# Patient Record
Sex: MALE
[De-identification: names, ages, dates, MRNs, and addresses within clinical notes are randomized per-mention and may not be internally consistent; named-entity substitution may affect disease eponyms.]

---

## 2022-05-18 ENCOUNTER — HOSPITAL ENCOUNTER (EMERGENCY)
Dept: HOSPITAL 5 - ED | Age: 27
LOS: 1 days | Discharge: HOME | End: 2022-05-19
Payer: SELF-PAY

## 2022-05-18 DIAGNOSIS — R45.851: Primary | ICD-10-CM

## 2022-05-18 LAB
ALBUMIN SERPL-MCNC: 4.1 G/DL (ref 3.9–5)
ALT SERPL-CCNC: 10 UNITS/L (ref 7–56)
BASOPHILS # (AUTO): 0 K/MM3 (ref 0–0.1)
BASOPHILS NFR BLD AUTO: 0.3 % (ref 0–1.8)
BUN SERPL-MCNC: 11 MG/DL (ref 9–20)
BUN/CREAT SERPL: 11 %
CALCIUM SERPL-MCNC: 9.2 MG/DL (ref 8.4–10.2)
EOSINOPHIL # BLD AUTO: 0 K/MM3 (ref 0–0.4)
EOSINOPHIL NFR BLD AUTO: 0.6 % (ref 0–4.3)
HCT VFR BLD CALC: 43.5 % (ref 35.5–45.6)
HEMOLYSIS INDEX: 8
HGB BLD-MCNC: 14.3 GM/DL (ref 11.8–15.2)
LYMPHOCYTES # BLD AUTO: 1.7 K/MM3 (ref 1.2–5.4)
LYMPHOCYTES NFR BLD AUTO: 35.1 % (ref 13.4–35)
MCHC RBC AUTO-ENTMCNC: 33 % (ref 32–34)
MCV RBC AUTO: 90 FL (ref 84–94)
MONOCYTES # (AUTO): 0.3 K/MM3 (ref 0–0.8)
MONOCYTES % (AUTO): 7 % (ref 0–7.3)
PLATELET # BLD: 165 K/MM3 (ref 140–440)
RBC # BLD AUTO: 4.81 M/MM3 (ref 3.65–5.03)

## 2022-05-18 PROCEDURE — 99284 EMERGENCY DEPT VISIT MOD MDM: CPT

## 2022-05-18 PROCEDURE — 80320 DRUG SCREEN QUANTALCOHOLS: CPT

## 2022-05-18 PROCEDURE — 80307 DRUG TEST PRSMV CHEM ANLYZR: CPT

## 2022-05-18 PROCEDURE — 85025 COMPLETE CBC W/AUTO DIFF WBC: CPT

## 2022-05-18 PROCEDURE — 81001 URINALYSIS AUTO W/SCOPE: CPT

## 2022-05-18 PROCEDURE — 36415 COLL VENOUS BLD VENIPUNCTURE: CPT

## 2022-05-18 PROCEDURE — 80053 COMPREHEN METABOLIC PANEL: CPT

## 2022-05-18 PROCEDURE — G0480 DRUG TEST DEF 1-7 CLASSES: HCPCS

## 2022-05-18 NOTE — EMERGENCY DEPARTMENT REPORT
ED General Adult HPI





- General


Chief complaint: Psych


Stated complaint: SI


Time Seen by Provider: 05/18/22 18:36


Source: patient, EMS


Mode of arrival: Stretcher


Limitations: No Limitations





- History of Present Illness


Initial comments: 





patient presents with complaints of suicidal thoughts. Denies concrete plan. Has

a hx of depression. Denies visual and auditory hallucinations. Denoies CP, SOB, 

palpitations, diaphoresis, abd pain, back pain, numbness, weakness. 





- Related Data


                                Home Medications











 Medication  Instructions  Recorded  Confirmed  Last Taken


 


No Known Home Medications [No  05/18/22 05/18/22 Unknown





Reported Home Medications]    











                                    Allergies











Allergy/AdvReac Type Severity Reaction Status Date / Time


 


No Known Allergies Allergy   Verified 05/18/22 17:49














ED Review of Systems


ROS: 


Stated complaint: SI


Other details as noted in HPI





Comment: All other systems reviewed and negative


Constitutional: denies: chills, fever





ED Past Medical Hx





- Past Medical History


Hx Psychiatric Treatment: Yes (Depression)


Hx HIV: Yes





- Medications


Home Medications: 


                                Home Medications











 Medication  Instructions  Recorded  Confirmed  Last Taken  Type


 


No Known Home Medications [No  05/18/22 05/18/22 Unknown History





Reported Home Medications]     














ED Physical Exam





- General


Limitations: No Limitations


General appearance: alert, in no apparent distress





- Head


Head exam: Present: atraumatic, normocephalic





- Eye


Eye exam: Present: PERRL, EOMI





- ENT


ENT exam: Present: mucous membranes moist, other (airway patent)





- Neck


Neck exam: Present: other (supple; no JVD)





- Respiratory


Respiratory exam: Present: other (good air entry, nml I:E, CTAB, no use of BETHANY)





- Cardiovascular


Cardiovascular Exam: Present: regular rate.  Absent: rubs, gallop





- GI/Abdominal


GI/Abdominal exam: Present: soft, normal bowel sounds.  Absent: distended, 

tenderness





- Extremities Exam


Extremities exam: Present: full ROM.  Absent: tenderness





- Back Exam


Back exam: Present: full ROM.  Absent: tenderness





- Neurological Exam


Neurological exam: Present: alert, oriented X3, CN II-XII intact.  Absent: motor

sensory deficit





- Psychiatric


Psychiatric exam: Present: flat affect, suicidal ideation.  Absent: homicidal 

ideation





- Skin


Skin exam: Present: warm, normal color





ED Course


                                   Vital Signs











  05/18/22 05/18/22





  17:46 20:21


 


Temperature  99.2 F


 


Pulse Rate 81 71


 


Respiratory  18





Rate  


 


Blood Pressure 130/95 107/78





[Left]  


 


O2 Sat by Pulse 98 98





Oximetry  














ED Medical Decision Making





- Lab Data


Result diagrams: 


                                 05/18/22 19:05





                                 05/18/22 19:05








                                Laboratory Tests











  05/18/22 05/18/22 05/18/22





  19:05 19:05 19:05


 


WBC  5.0  


 


RBC  4.81  


 


Hgb  14.3  


 


Hct  43.5  


 


MCV  90  


 


MCH  30  


 


MCHC  33  


 


RDW  13.2  


 


Plt Count  165  


 


Lymph % (Auto)  35.1 H  


 


Mono % (Auto)  7.0  


 


Eos % (Auto)  0.6  


 


Baso % (Auto)  0.3  


 


Lymph # (Auto)  1.7  


 


Mono # (Auto)  0.3  


 


Eos # (Auto)  0.0  


 


Baso # (Auto)  0.0  


 


Seg Neutrophils %  57.0  


 


Seg Neutrophils #  2.8  


 


Sodium   138 


 


Potassium   3.9 


 


Chloride   101.0 


 


Carbon Dioxide   26 


 


Anion Gap   15 


 


BUN   11 


 


Creatinine   1.0 


 


Estimated GFR   > 60 


 


BUN/Creatinine Ratio   11 


 


Glucose   86 


 


Calcium   9.2 


 


Total Bilirubin   0.50 


 


AST   15 


 


ALT   10 


 


Alkaline Phosphatase   94 


 


Total Protein   7.9 


 


Albumin   4.1 


 


Albumin/Globulin Ratio   1.1 


 


Salicylates    < 0.3 L


 


Acetaminophen   


 


Plasma/Serum Alcohol   














  05/18/22 05/18/22





  19:05 19:05


 


WBC  


 


RBC  


 


Hgb  


 


Hct  


 


MCV  


 


MCH  


 


MCHC  


 


RDW  


 


Plt Count  


 


Lymph % (Auto)  


 


Mono % (Auto)  


 


Eos % (Auto)  


 


Baso % (Auto)  


 


Lymph # (Auto)  


 


Mono # (Auto)  


 


Eos # (Auto)  


 


Baso # (Auto)  


 


Seg Neutrophils %  


 


Seg Neutrophils #  


 


Sodium  


 


Potassium  


 


Chloride  


 


Carbon Dioxide  


 


Anion Gap  


 


BUN  


 


Creatinine  


 


Estimated GFR  


 


BUN/Creatinine Ratio  


 


Glucose  


 


Calcium  


 


Total Bilirubin  


 


AST  


 


ALT  


 


Alkaline Phosphatase  


 


Total Protein  


 


Albumin  


 


Albumin/Globulin Ratio  


 


Salicylates  


 


Acetaminophen  5.0 L 


 


Plasma/Serum Alcohol   < 0.01

















UA, U txo pending





- Medical Decision Making





1013 signed. MH consulted. 


Critical care attestation.: 


If time is entered above; I have spent that time in minutes in the direct care 

of this critically ill patient, excluding procedure time.








ED Disposition


Clinical Impression: 


 Suicidal thoughts





Disposition: 30 STILL A PATIENT


Is pt being admited?: No


Does the pt Need Aspirin: No


Condition: Stable


Time of Disposition: 21:00 (Patient care transferred to Dr. Short (oncoming night

 ER doc). Sign out was given by me to him. )

## 2022-05-19 VITALS — SYSTOLIC BLOOD PRESSURE: 113 MMHG | DIASTOLIC BLOOD PRESSURE: 88 MMHG

## 2022-05-19 LAB
BILIRUB UR QL STRIP: (no result)
BLOOD UR QL VISUAL: (no result)
MUCOUS THREADS #/AREA URNS HPF: (no result) /HPF
PH UR STRIP: 7 [PH] (ref 5–7)
PROT UR STRIP-MCNC: (no result) MG/DL
RBC #/AREA URNS HPF: < 1 /HPF (ref 0–6)
UROBILINOGEN UR-MCNC: 2 MG/DL (ref ?–2)
WBC #/AREA URNS HPF: 1 /HPF (ref 0–6)
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
## 2022-05-19 NOTE — EVENT NOTE
Date: 05/19/22


Follow up progress note on psych patient. Patient had presented with complaints 

of SI. Patient was seen by psych and cleared for discharge, aoutpatyient follow 

up, w Rx for trazodone and sertraline. No issues overnight. No complaints this 

morning. 














                                Laboratory Tests











  05/18/22 05/18/22 05/18/22





  19:05 19:05 19:05


 


WBC  5.0  


 


RBC  4.81  


 


Hgb  14.3  


 


Hct  43.5  


 


MCV  90  


 


MCH  30  


 


MCHC  33  


 


RDW  13.2  


 


Plt Count  165  


 


Lymph % (Auto)  35.1 H  


 


Mono % (Auto)  7.0  


 


Eos % (Auto)  0.6  


 


Baso % (Auto)  0.3  


 


Lymph # (Auto)  1.7  


 


Mono # (Auto)  0.3  


 


Eos # (Auto)  0.0  


 


Baso # (Auto)  0.0  


 


Seg Neutrophils %  57.0  


 


Seg Neutrophils #  2.8  


 


Sodium   138 


 


Potassium   3.9 


 


Chloride   101.0 


 


Carbon Dioxide   26 


 


Anion Gap   15 


 


BUN   11 


 


Creatinine   1.0 


 


Estimated GFR   > 60 


 


BUN/Creatinine Ratio   11 


 


Glucose   86 


 


Calcium   9.2 


 


Total Bilirubin   0.50 


 


AST   15 


 


ALT   10 


 


Alkaline Phosphatase   94 


 


Total Protein   7.9 


 


Albumin   4.1 


 


Albumin/Globulin Ratio   1.1 


 


Urine Color   


 


Urine Turbidity   


 


Urine pH   


 


Ur Specific Gravity   


 


Urine Protein   


 


Urine Glucose (UA)   


 


Urine Ketones   


 


Urine Blood   


 


Urine Nitrite   


 


Urine Bilirubin   


 


Urine Urobilinogen   


 


Ur Leukocyte Esterase   


 


Urine WBC (Auto)   


 


Urine RBC (Auto)   


 


Urine Mucus   


 


Salicylates    < 0.3 L


 


Urine Opiates Screen   


 


Urine Methadone Screen   


 


Acetaminophen   


 


Ur Barbiturates Screen   


 


Ur Phencyclidine Scrn   


 


Ur Amphetamines Screen   


 


U Benzodiazepines Scrn   


 


Urine Cocaine Screen   


 


U Marijuana (THC) Screen   


 


Drugs of Abuse Note   


 


Plasma/Serum Alcohol   














  05/18/22 05/18/22 05/19/22





  19:05 19:05 Unknown


 


WBC   


 


RBC   


 


Hgb   


 


Hct   


 


MCV   


 


MCH   


 


MCHC   


 


RDW   


 


Plt Count   


 


Lymph % (Auto)   


 


Mono % (Auto)   


 


Eos % (Auto)   


 


Baso % (Auto)   


 


Lymph # (Auto)   


 


Mono # (Auto)   


 


Eos # (Auto)   


 


Baso # (Auto)   


 


Seg Neutrophils %   


 


Seg Neutrophils #   


 


Sodium   


 


Potassium   


 


Chloride   


 


Carbon Dioxide   


 


Anion Gap   


 


BUN   


 


Creatinine   


 


Estimated GFR   


 


BUN/Creatinine Ratio   


 


Glucose   


 


Calcium   


 


Total Bilirubin   


 


AST   


 


ALT   


 


Alkaline Phosphatase   


 


Total Protein   


 


Albumin   


 


Albumin/Globulin Ratio   


 


Urine Color    Yellow


 


Urine Turbidity    Clear


 


Urine pH    7.0


 


Ur Specific Gravity    1.016


 


Urine Protein    <15 mg/dl


 


Urine Glucose (UA)    Neg


 


Urine Ketones    Neg


 


Urine Blood    Neg


 


Urine Nitrite    Neg


 


Urine Bilirubin    Neg


 


Urine Urobilinogen    2.0


 


Ur Leukocyte Esterase    Neg


 


Urine WBC (Auto)    1.0


 


Urine RBC (Auto)    < 1.0


 


Urine Mucus    Few


 


Salicylates   


 


Urine Opiates Screen   


 


Urine Methadone Screen   


 


Acetaminophen  5.0 L  


 


Ur Barbiturates Screen   


 


Ur Phencyclidine Scrn   


 


Ur Amphetamines Screen   


 


U Benzodiazepines Scrn   


 


Urine Cocaine Screen   


 


U Marijuana (THC) Screen   


 


Drugs of Abuse Note   


 


Plasma/Serum Alcohol   < 0.01 














  05/19/22





  Unknown


 


WBC 


 


RBC 


 


Hgb 


 


Hct 


 


MCV 


 


MCH 


 


MCHC 


 


RDW 


 


Plt Count 


 


Lymph % (Auto) 


 


Mono % (Auto) 


 


Eos % (Auto) 


 


Baso % (Auto) 


 


Lymph # (Auto) 


 


Mono # (Auto) 


 


Eos # (Auto) 


 


Baso # (Auto) 


 


Seg Neutrophils % 


 


Seg Neutrophils # 


 


Sodium 


 


Potassium 


 


Chloride 


 


Carbon Dioxide 


 


Anion Gap 


 


BUN 


 


Creatinine 


 


Estimated GFR 


 


BUN/Creatinine Ratio 


 


Glucose 


 


Calcium 


 


Total Bilirubin 


 


AST 


 


ALT 


 


Alkaline Phosphatase 


 


Total Protein 


 


Albumin 


 


Albumin/Globulin Ratio 


 


Urine Color 


 


Urine Turbidity 


 


Urine pH 


 


Ur Specific Gravity 


 


Urine Protein 


 


Urine Glucose (UA) 


 


Urine Ketones 


 


Urine Blood 


 


Urine Nitrite 


 


Urine Bilirubin 


 


Urine Urobilinogen 


 


Ur Leukocyte Esterase 


 


Urine WBC (Auto) 


 


Urine RBC (Auto) 


 


Urine Mucus 


 


Salicylates 


 


Urine Opiates Screen  Negative


 


Urine Methadone Screen  Negative


 


Acetaminophen 


 


Ur Barbiturates Screen  Negative


 


Ur Phencyclidine Scrn  Negative


 


Ur Amphetamines Screen  Negative


 


U Benzodiazepines Scrn  Negative


 


Urine Cocaine Screen  Negative


 


U Marijuana (THC) Screen  Positive


 


Drugs of Abuse Note  Disclamer


 


Plasma/Serum Alcohol 














IMPRESSION:





1. MAJOR DEPRESSIVE DISORDER





ED Disposition


Disposition: 01 HOME / SELF CARE / HOMELESS


Is pt being admited?: No


Does the pt Need Aspirin: No


Condition: Stable


Additional Instructions: 


Professional and Agency Contacts To help [facility] (24/7)


[place] Crisis Line: 1-222.983.5826


Suicide Prevention Line: 1-638.403.4672


Crisis Text Line: Text START to 503713


Emergency: 911





Outpatient [facility] Behavioral Health Resources:


[place]:


[facility]


[address], [place] [address]


Phone: [phone] 





[place]: [facility]  [facility]


[address] [place] [address] 


Phone: [phone]


Monday thru Friday - 8am - 5pm


**Call to schedule an assessment for mental health and substance abuse 

programs***





[facility]


Address: [address], [place] [address]


Monday thru Friday- 7am-2pm


Phone: [phone]





[facility]


Address: [address], [place] [address]


Monday thru Friday: 8:30AM-5PM


Phone: [phone]














Follow up with your regular doctor within 2 - 4 days. Return to the ER if your 

symptoms worsen. 








Prescriptions: 


traZODone [Desyrel] 50 mg PO QHS 30 Days #30 tab


Sertraline [Zoloft] 25 mg PO QDAY 30 Days #30 tab


Time of Disposition: 16:00